# Patient Record
Sex: FEMALE | Race: WHITE | NOT HISPANIC OR LATINO | RURAL
[De-identification: names, ages, dates, MRNs, and addresses within clinical notes are randomized per-mention and may not be internally consistent; named-entity substitution may affect disease eponyms.]

---

## 2017-07-31 ENCOUNTER — OFFICE VISIT (OUTPATIENT)
Dept: OBSTETRICS AND GYNECOLOGY | Facility: CLINIC | Age: 22
End: 2017-07-31

## 2017-07-31 VITALS
SYSTOLIC BLOOD PRESSURE: 117 MMHG | HEIGHT: 71 IN | HEART RATE: 79 BPM | WEIGHT: 164 LBS | DIASTOLIC BLOOD PRESSURE: 75 MMHG | BODY MASS INDEX: 22.96 KG/M2

## 2017-07-31 DIAGNOSIS — Z15.89 MTHFR MUTATION: ICD-10-CM

## 2017-07-31 DIAGNOSIS — Z30.011 ENCOUNTER FOR INITIAL PRESCRIPTION OF CONTRACEPTIVE PILLS: ICD-10-CM

## 2017-07-31 DIAGNOSIS — N92.6 IRREGULAR MENSTRUAL BLEEDING: Primary | ICD-10-CM

## 2017-07-31 PROCEDURE — 99202 OFFICE O/P NEW SF 15 MIN: CPT | Performed by: NURSE PRACTITIONER

## 2017-07-31 RX ORDER — NORETHINDRONE ACETATE AND ETHINYL ESTRADIOL AND FERROUS FUMARATE 1MG-20(24)
1 KIT ORAL DAILY
Qty: 28 TABLET | Refills: 3 | Status: SHIPPED | OUTPATIENT
Start: 2017-07-31 | End: 2017-10-30 | Stop reason: SDUPTHER

## 2017-07-31 RX ORDER — LANOLIN ALCOHOL/MO/W.PET/CERES
400 CREAM (GRAM) TOPICAL DAILY
COMMUNITY

## 2017-07-31 RX ORDER — UBIDECARENONE 75 MG
50 CAPSULE ORAL DAILY
COMMUNITY

## 2017-07-31 RX ORDER — ASPIRIN 81 MG/1
81 TABLET, CHEWABLE ORAL DAILY
COMMUNITY

## 2017-07-31 RX ORDER — METHYLPHENIDATE HYDROCHLORIDE 54 MG/1
TABLET ORAL
COMMUNITY
Start: 2017-05-01

## 2017-08-02 PROBLEM — N92.6 IRREGULAR MENSTRUAL BLEEDING: Status: ACTIVE | Noted: 2017-08-02

## 2017-08-02 NOTE — PROGRESS NOTES
Subjective   Chief Complaint   Patient presents with   • Gynecologic Exam     bijan Mohr is a 21 y.o. year old  presenting to be seen with complaints of trouble with her menses.   Current birth control method: none and condoms.    Patient's last menstrual period was 2017 (approximate). She had an expected period on -, then had bloody d/c on , started heavy bleeding again on -. On  she had bright red bleeding again until . Had intercourse on  and started bright red bleeding again on .    The last time she recalls having predictable regular cycles was May 2017.      · Additional notable symptoms: fatigue  · Changes in weight: weight has been stable  · Recent increase in stress? no  · Is there associated pain ? no    Past 6 month menstrual history:    Cycle Frequency: regular, predictable and consistent every 28 - 32 days   Menstrual cycle character: flow is typically normal   Cycle Duration: 5 - 7   Number of heavy days of flows: 2   Dysmenorrhea: mild and is not affecting her activities of daily living   PMS: mild and is not affecting her activities of daily living   Intermenstrual bleeding present: {no   Post-coital bleeding present: no     She is sexually active.  In the past 12 months there has not been new sexual partners.  Condoms ARE typically used.  She would not like to be screened for STD's at today's exam.     No Additional Complaints Reported    The following portions of the patient's history were reviewed and updated as appropriate:problem list, current medications, allergies, past family history, past medical history, past social history and past surgical history    Smoking status: Not on file                        Smokeless status: Not on file                       Review of Systems   Constitutional: Positive for fatigue. Negative for activity change, appetite change, diaphoresis and unexpected weight change.   HENT: Negative for trouble  "swallowing and voice change.    Respiratory: Negative for chest tightness and shortness of breath.    Cardiovascular: Negative for chest pain, palpitations and leg swelling.   Gastrointestinal: Negative for abdominal distention, abdominal pain, constipation, diarrhea, nausea and vomiting.   Endocrine: Negative.    Genitourinary: Positive for menstrual problem and vaginal bleeding. Negative for difficulty urinating, dyspareunia, dysuria, genital sores, pelvic pain, vaginal discharge and vaginal pain.   Musculoskeletal: Negative for gait problem and myalgias.   Skin: Negative for color change, pallor and rash.   Allergic/Immunologic: Negative for immunocompromised state.   Neurological: Negative for dizziness, weakness, light-headedness and headaches.   Hematological: Negative for adenopathy. Does not bruise/bleed easily.   Psychiatric/Behavioral: Negative for agitation, dysphoric mood and sleep disturbance. The patient is not nervous/anxious.          Objective   /75  Pulse 79  Ht 71\" (180.3 cm)  Wt 164 lb (74.4 kg)  LMP 07/14/2017 (Approximate)  BMI 22.87 kg/m2    General:  well developed; well nourished  no acute distress  appears stated age   Skin:  No suspicious lesions seen   Thyroid: normal to inspection and palpation   Lungs:  breathing is unlabored  clear to auscultation bilaterally   Heart:  regular rate and rhythm, S1, S2 normal, no murmur, click, rub or gallop   Breasts:  Not performed.   Abdomen: soft, non-tender; no masses  no umbilical or inginual hernias are present  no hepato-splenomegaly  Normal findings: bowel sounds normal   Pelvis: Not performed.     Lab Review   Requested most recent labs from Dr. Piyush Roman    Imaging   No data reviewed         Diagnoses and all orders for this visit:    Irregular menstrual bleeding    Encounter for initial prescription of contraceptive pills    MTHFR mutation    Other orders  -     methylphenidate (CONCERTA) 54 MG CR tablet;   -     folic acid " (FOLVITE) 400 MCG tablet; Take 400 mcg by mouth Daily.  -     vitamin B-12 (CYANOCOBALAMIN) 100 MCG tablet; Take 50 mcg by mouth Daily.  -     aspirin 81 MG chewable tablet; Chew 81 mg Daily.  -     norethindrone-ethinyl estradiol-ferrous fumarate (LOESTRIN 24 FE) 1-20 MG-MCG(24) per tablet; Take 1 tablet by mouth Daily.        New Medications Ordered This Visit   Medications   • methylphenidate (CONCERTA) 54 MG CR tablet   • folic acid (FOLVITE) 400 MCG tablet     Sig: Take 400 mcg by mouth Daily.   • vitamin B-12 (CYANOCOBALAMIN) 100 MCG tablet     Sig: Take 50 mcg by mouth Daily.   • aspirin 81 MG chewable tablet     Sig: Chew 81 mg Daily.   • norethindrone-ethinyl estradiol-ferrous fumarate (LOESTRIN 24 FE) 1-20 MG-MCG(24) per tablet     Sig: Take 1 tablet by mouth Daily.     Dispense:  28 tablet     Refill:  3     She was diagnosed with MTHFR at age 15 when she was having very heavy periods and her grandmother was diagnosed with it as well. She has never had a blood clot and takes her medications routinely for MTHFR. After consultation with Dr. Herrera regarding contraception options we discussed all of her options including: COCs, POPs, IUDs, depo provera, Xulane or Nuvaring. She would like to take something to help decrease the flow and duration of her period so she'd like to start with a ASHER. We discussed risks, benefits, potential s/e and administration.     I will review her labs when available and call her to have additional labs drawn if needed. Start OCP today and follow up in 3 months. Has never had a GYN exam/pap smear so will need to do that next visit as well.    This note was electronically signed.    Lilo Mg, MAI    August 2, 2017

## 2017-10-30 ENCOUNTER — PROCEDURE VISIT (OUTPATIENT)
Dept: OBSTETRICS AND GYNECOLOGY | Facility: CLINIC | Age: 22
End: 2017-10-30

## 2017-10-30 VITALS
DIASTOLIC BLOOD PRESSURE: 100 MMHG | SYSTOLIC BLOOD PRESSURE: 131 MMHG | BODY MASS INDEX: 23.66 KG/M2 | HEIGHT: 71 IN | WEIGHT: 169 LBS

## 2017-10-30 DIAGNOSIS — Z01.419 ENCOUNTER FOR GYNECOLOGICAL EXAMINATION WITHOUT ABNORMAL FINDING: Primary | ICD-10-CM

## 2017-10-30 DIAGNOSIS — Z30.41 ENCOUNTER FOR SURVEILLANCE OF CONTRACEPTIVE PILLS: ICD-10-CM

## 2017-10-30 DIAGNOSIS — N92.6 IRREGULAR MENSTRUAL BLEEDING: ICD-10-CM

## 2017-10-30 PROCEDURE — 88142 CYTOPATH C/V THIN LAYER: CPT | Performed by: NURSE PRACTITIONER

## 2017-10-30 PROCEDURE — 99395 PREV VISIT EST AGE 18-39: CPT | Performed by: NURSE PRACTITIONER

## 2017-10-30 RX ORDER — NORETHINDRONE ACETATE AND ETHINYL ESTRADIOL AND FERROUS FUMARATE 1MG-20(24)
1 KIT ORAL DAILY
Qty: 28 TABLET | Refills: 3 | Status: SHIPPED | OUTPATIENT
Start: 2017-10-30 | End: 2017-11-16

## 2017-10-30 NOTE — PROGRESS NOTES
Subjective   Leilani Mohr is a 21 y.o. G0 here for pap smear and 3 month follow up on OCPs. Reports that she missed several pills about 4 weeks ago and had to take 2 per day to catch up. Has had breast tenderness, headaches and nausea since then. Also having BTB on 2nd week of pill pack, but is just now at the beginning of the second week of her 3rd pack. S/E were improved until she had to catch up on her missed pills.    Gynecologic Exam   The patient's primary symptoms include vaginal bleeding. The patient's pertinent negatives include no genital itching, genital lesions, genital odor, genital rash, missed menses, pelvic pain or vaginal discharge. Associated symptoms include headaches and nausea. Pertinent negatives include no abdominal pain, back pain, dysuria, rash or vomiting. She is sexually active. No, her partner does not have an STD. She uses oral contraceptives for contraception. Her menstrual history has been irregular.     LMP- 2 weeks ago (as expected); started spotting yesterday after intercourse.  Last pap- never    The following portions of the patient's history were reviewed and updated as appropriate: allergies, current medications, past family history, past medical history, past social history, past surgical history and problem list.    Review of Systems   Constitutional: Negative for activity change, appetite change, fatigue and unexpected weight change.   Respiratory: Negative for chest tightness and shortness of breath.    Cardiovascular: Negative for chest pain, palpitations and leg swelling.   Gastrointestinal: Positive for nausea. Negative for abdominal distention, abdominal pain and vomiting.   Endocrine: Negative.    Genitourinary: Positive for menstrual problem and vaginal bleeding. Negative for difficulty urinating, dyspareunia, dysuria, missed menses, pelvic pain, vaginal discharge and vaginal pain.   Musculoskeletal: Negative for back pain and gait problem.   Skin: Negative for color  change, pallor and rash.   Neurological: Positive for headaches. Negative for dizziness, weakness and light-headedness.   Hematological: Negative for adenopathy.   Psychiatric/Behavioral: Negative for agitation, dysphoric mood and sleep disturbance. The patient is not nervous/anxious.        Objective   Physical Exam   Constitutional: She is oriented to person, place, and time.   Cardiovascular: Normal rate, regular rhythm, normal heart sounds and intact distal pulses.    Pulmonary/Chest: Effort normal and breath sounds normal. Right breast exhibits no inverted nipple, no mass, no nipple discharge, no skin change and no tenderness. Left breast exhibits no inverted nipple, no mass, no nipple discharge, no skin change and no tenderness. Breasts are symmetrical. There is no breast swelling.   Abdominal: Soft. Bowel sounds are normal.   Genitourinary: Vagina normal and uterus normal. No breast bleeding. No labial fusion. There is no rash, tenderness, lesion or injury on the right labia. There is no rash, tenderness, lesion or injury on the left labia. Cervix exhibits discharge. Cervix exhibits no motion tenderness and no friability. Right adnexum displays no mass, no tenderness and no fullness. Left adnexum displays no mass, no tenderness and no fullness.   Genitourinary Comments: Bleeding noted at cervical os. Os cleaned with large q-tip and pap smear obtained.   Lymphadenopathy:     She has no axillary adenopathy.        Right: No inguinal adenopathy present.        Left: No inguinal adenopathy present.   Neurological: She is alert and oriented to person, place, and time.   Skin: Skin is warm, dry and intact.   Psychiatric: She has a normal mood and affect. Her behavior is normal.   Nursing note and vitals reviewed.        Assessment/Plan   Leilani was seen today for gynecologic exam.    Diagnoses and all orders for this visit:    Encounter for gynecological examination without abnormal finding  -     Liquid-based  Pap Smear, Screening - ThinPrep Vial, Cervix    Irregular menstrual bleeding    Encounter for surveillance of contraceptive pills    Other orders  -     norethindrone-ethinyl estradiol-ferrous fumarate (LOESTRIN 24 FE) 1-20 MG-MCG(24) per tablet; Take 1 tablet by mouth Daily.        Continue current OCP through the end of next pack. If still having BTB she is to call and we'll change the progesterone. She has a MTHFR mutation and reports that she has been told that she is at a higher risk of developing blood clots so increasing the estrogen is a last resort. I have requested medical records from Dr. TERESE Murcia in [place] to review lab results for MTHFR. F/U in 1 year or sooner if needed.

## 2017-11-06 LAB
LAB AP CASE REPORT: NORMAL
LAB AP GYN ADDITIONAL INFORMATION: NORMAL
LAB AP GYN OTHER FINDINGS: NORMAL
Lab: NORMAL
PATH INTERP SPEC-IMP: NORMAL
STAT OF ADQ CVX/VAG CYTO-IMP: NORMAL

## 2017-11-10 ENCOUNTER — TELEPHONE (OUTPATIENT)
Dept: OBSTETRICS AND GYNECOLOGY | Facility: CLINIC | Age: 22
End: 2017-11-10

## 2017-11-10 NOTE — TELEPHONE ENCOUNTER
Vmail not set up. Pap smear needs to be recollected due to blood. Need to know if she's decided on birth control options.

## 2017-11-16 ENCOUNTER — TELEPHONE (OUTPATIENT)
Dept: OBSTETRICS AND GYNECOLOGY | Facility: CLINIC | Age: 22
End: 2017-11-16

## 2017-11-16 RX ORDER — ACETAMINOPHEN AND CODEINE PHOSPHATE 120; 12 MG/5ML; MG/5ML
1 SOLUTION ORAL DAILY
Qty: 84 TABLET | Refills: 4 | Status: SHIPPED | OUTPATIENT
Start: 2017-11-16 | End: 2018-11-16

## 2017-11-16 NOTE — TELEPHONE ENCOUNTER
Wants to try POP for now. Discussed pap result. Will reschedule for repeat pap in a few months when she can predict her bleeding a little better.

## 2017-11-16 NOTE — TELEPHONE ENCOUNTER
----- Message from Starla Gonzalez LPN sent at 11/16/2017  2:58 PM CST -----  Contact: [phone]      ----- Message -----     From: Blilie Moore CNA     Sent: 11/16/2017  12:53 PM       To: Starla Gonzalez LPN    Patient called regarding questions about birth control. She would like for someone to call her back at [phone]. She said she is at work today so if she does not answer to please leave voicemail.Thanks

## 2018-12-20 RX ORDER — ACETAMINOPHEN AND CODEINE PHOSPHATE 120; 12 MG/5ML; MG/5ML
1 SOLUTION ORAL DAILY
Qty: 84 TABLET | Refills: 4 | Status: SHIPPED | OUTPATIENT
Start: 2018-12-20 | End: 2019-12-20